# Patient Record
Sex: FEMALE | Race: WHITE | NOT HISPANIC OR LATINO | URBAN - METROPOLITAN AREA
[De-identification: names, ages, dates, MRNs, and addresses within clinical notes are randomized per-mention and may not be internally consistent; named-entity substitution may affect disease eponyms.]

---

## 2017-05-30 ENCOUNTER — EMERGENCY (EMERGENCY)
Facility: HOSPITAL | Age: 22
LOS: 1 days | End: 2017-05-30
Payer: MEDICAID

## 2017-05-30 PROCEDURE — 76856 US EXAM PELVIC COMPLETE: CPT | Mod: 26

## 2017-05-30 PROCEDURE — 74177 CT ABD & PELVIS W/CONTRAST: CPT | Mod: 26

## 2017-05-30 PROCEDURE — 76830 TRANSVAGINAL US NON-OB: CPT | Mod: 26

## 2017-05-30 PROCEDURE — 99285 EMERGENCY DEPT VISIT HI MDM: CPT | Mod: 25

## 2017-07-06 PROCEDURE — 76830 TRANSVAGINAL US NON-OB: CPT | Mod: 26

## 2017-07-06 PROCEDURE — 76856 US EXAM PELVIC COMPLETE: CPT | Mod: 26

## 2017-07-06 PROCEDURE — 71010: CPT | Mod: 26

## 2017-07-06 PROCEDURE — 74177 CT ABD & PELVIS W/CONTRAST: CPT | Mod: 26

## 2017-07-06 PROCEDURE — 99285 EMERGENCY DEPT VISIT HI MDM: CPT

## 2017-07-07 ENCOUNTER — INPATIENT (INPATIENT)
Facility: HOSPITAL | Age: 22
LOS: 1 days | Discharge: HOME | End: 2017-07-09
Payer: MEDICAID

## 2017-07-07 ENCOUNTER — OUTPATIENT (OUTPATIENT)
Dept: OUTPATIENT SERVICES | Facility: HOSPITAL | Age: 22
LOS: 1 days | End: 2017-07-07

## 2018-02-08 PROBLEM — Z00.00 ENCOUNTER FOR PREVENTIVE HEALTH EXAMINATION: Status: ACTIVE | Noted: 2018-02-08

## 2018-02-14 ENCOUNTER — APPOINTMENT (OUTPATIENT)
Dept: ANTEPARTUM | Facility: CLINIC | Age: 23
End: 2018-02-14
Payer: MEDICAID

## 2018-02-14 ENCOUNTER — ASOB RESULT (OUTPATIENT)
Age: 23
End: 2018-02-14

## 2018-02-14 PROCEDURE — 76817 TRANSVAGINAL US OBSTETRIC: CPT

## 2018-02-14 PROCEDURE — 76811 OB US DETAILED SNGL FETUS: CPT

## 2018-04-11 ENCOUNTER — ASOB RESULT (OUTPATIENT)
Age: 23
End: 2018-04-11

## 2018-04-11 ENCOUNTER — APPOINTMENT (OUTPATIENT)
Dept: ANTEPARTUM | Facility: CLINIC | Age: 23
End: 2018-04-11
Payer: MEDICAID

## 2018-04-11 PROCEDURE — 76816 OB US FOLLOW-UP PER FETUS: CPT

## 2018-04-11 PROCEDURE — 76819 FETAL BIOPHYS PROFIL W/O NST: CPT

## 2018-05-09 ENCOUNTER — APPOINTMENT (OUTPATIENT)
Dept: ANTEPARTUM | Facility: CLINIC | Age: 23
End: 2018-05-09

## 2018-05-22 ENCOUNTER — APPOINTMENT (OUTPATIENT)
Dept: ANTEPARTUM | Facility: CLINIC | Age: 23
End: 2018-05-22

## 2018-05-22 ENCOUNTER — APPOINTMENT (OUTPATIENT)
Dept: MATERNAL FETAL MEDICINE | Facility: CLINIC | Age: 23
End: 2018-05-22

## 2018-05-29 ENCOUNTER — APPOINTMENT (OUTPATIENT)
Dept: MATERNAL FETAL MEDICINE | Facility: CLINIC | Age: 23
End: 2018-05-29

## 2018-05-29 ENCOUNTER — APPOINTMENT (OUTPATIENT)
Dept: ANTEPARTUM | Facility: CLINIC | Age: 23
End: 2018-05-29

## 2019-09-27 ENCOUNTER — EMERGENCY (EMERGENCY)
Facility: HOSPITAL | Age: 24
LOS: 0 days | Discharge: HOME | End: 2019-09-27
Attending: EMERGENCY MEDICINE
Payer: MEDICAID

## 2019-09-27 VITALS — WEIGHT: 121.92 LBS | HEIGHT: 64 IN

## 2019-09-27 VITALS
TEMPERATURE: 99 F | RESPIRATION RATE: 18 BRPM | HEART RATE: 99 BPM | OXYGEN SATURATION: 96 % | DIASTOLIC BLOOD PRESSURE: 85 MMHG | SYSTOLIC BLOOD PRESSURE: 125 MMHG

## 2019-09-27 DIAGNOSIS — R11.0 NAUSEA: ICD-10-CM

## 2019-09-27 DIAGNOSIS — R21 RASH AND OTHER NONSPECIFIC SKIN ERUPTION: ICD-10-CM

## 2019-09-27 PROCEDURE — 99283 EMERGENCY DEPT VISIT LOW MDM: CPT

## 2019-09-27 RX ORDER — DIPHENHYDRAMINE HCL 50 MG
50 CAPSULE ORAL ONCE
Refills: 0 | Status: COMPLETED | OUTPATIENT
Start: 2019-09-27 | End: 2019-09-27

## 2019-09-27 RX ORDER — DIPHENHYDRAMINE HCL 50 MG
1 CAPSULE ORAL
Qty: 30 | Refills: 0
Start: 2019-09-27

## 2019-09-27 RX ORDER — CEPHALEXIN 500 MG
1 CAPSULE ORAL
Qty: 28 | Refills: 0
Start: 2019-09-27 | End: 2019-10-03

## 2019-09-27 RX ADMIN — Medication 50 MILLIGRAM(S): at 10:25

## 2019-09-27 NOTE — ED STATDOCS - OBJECTIVE STATEMENT
25 y/o female with no significant PMHx presents to the ED c/o gradual onset of diffuse pruritic rash. Pt believes she has poison ivy that began on upper extremities and is now diffuse to back and lower extremities. No Hx of poison ivy, but pt notes she recently went camping and was "cutting into roots." Pt notes Sx began roughly 2 days after camping. +Itching. Denies fever, chills. Denies possibility of pregnancy. Pt notes some nausea.

## 2019-09-27 NOTE — ED STATDOCS - PATIENT PORTAL LINK FT
You can access the FollowMyHealth Patient Portal offered by [facility] by registering at the following website: [email or web address]. By joining "[employer]"’s FollowMyHealth portal, you will also be able to view your health information using other applications (apps) compatible with our system.

## 2019-09-27 NOTE — ED STATDOCS - NSFOLLOWUPINSTRUCTIONS_ED_ALL_ED_FT
Poison Ivy Dermatitis  Image   Poison ivy dermatitis is redness and soreness (inflammation) of the skin. It is caused by a chemical that is found on the leaves of the poison ivy plant. You may also have itching, a rash, and blisters. Symptoms often clear up in 1–2 weeks.    You may get this condition by touching a poison ivy plant. You can also get it by touching something that has the chemical on it. This may include animals or objects that have come in contact with the plant.    Follow these instructions at home:  General instructions     Take or apply over-the-counter and prescription medicines only as told by your doctor.  If you touch poison ivy, wash your skin with soap and cold water right away.  Use hydrocortisone creams or calamine lotion as needed to help with itching.  Take oatmeal baths as needed. Use colloidal oatmeal. You can get this at a pharmacy or grocery store. Follow the instructions on the package.  Do not scratch or rub your skin.  While you have the rash, wash your clothes right after you wear them.  Prevention     Image   Know what poison ivy looks like so you can avoid it. This plant has three leaves with flowering branches on a single stem. The leaves are glossy. They have uneven edges that come to a point at the front.  If you have touched poison ivy, wash with soap and water right away. Be sure to wash under your fingernails.  When hiking or camping, wear long pants, a long-sleeved shirt, tall socks, and hiking boots. You can also use a lotion on your skin that helps to prevent contact with the chemical on the plant.  If you think that your clothes or outdoor gear came in contact with poison ivy, rinse them off with a garden hose before you bring them inside your house.  Contact a doctor if:  You have open sores in the rash area.  You have more redness, swelling, or pain in the affected area.  You have redness that spreads beyond the rash area.  You have fluid, blood, or pus coming from the affected area.  You have a fever.  You have a rash over a large area of your body.  You have a rash on your eyes, mouth, or genitals.  Your rash does not get better after a few days.  Get help right away if:  Your face swells or your eyes swell shut.  You have trouble breathing.  You have trouble swallowing.  This information is not intended to replace advice given to you by your health care provider. Make sure you discuss any questions you have with your health care provider.

## 2019-09-27 NOTE — ED ADULT TRIAGE NOTE - CHIEF COMPLAINT QUOTE
Pt ambulatory to triage with rash on arms, upper legs and buttocks, feels it may be poison ivy exposure from camping last weekend. Pt also wants to be evaluated for PID symptoms, states she was diagnosed with PID a year ago and has lower abdominal pain when she presses on her pelvis - denies fever

## 2019-09-27 NOTE — ED STATDOCS - PROGRESS NOTE DETAILS
25 y/o F with no PMH presents with diffuse itchy rash x 3-4 days. pt states she recently went camping and rash started shortly afterwards. While camping, was advised by her boyfriend that she may have been exposed to poison ivy. States she had areas on her skin that she "popped and had clear fluid come out." Has not used medications at home to manage symptoms. Denies fever, chills, nausea, vomiting, dysuria. No contacts with similar symptoms. No other reported complaints. PE: Well appearing. Scratching arms and legs during exam. Cardiac: s1s2. RRR. Lungs: CTAB. Abdomen: NBS x4, soft, nontender. Skin: Multiple old wounds with scabs. Pt scratching during exam. A/P: Likely contact dermatitis secondary to poison ivy. Concern for superinfection. Will manage with prednisone and keflex. d/c home with PCP follow up. - Miguel Shetty PA-C

## 2019-09-27 NOTE — ED STATDOCS - CARE PLAN
Principal Discharge DX:	Irritant contact dermatitis due to plants, except food Principal Discharge DX:	Rash

## 2019-09-27 NOTE — ED STATDOCS - ATTENDING CONTRIBUTION TO CARE
I, Nehemiah Kaur, performed the initial face to face bedside interview with this patient regarding history of present illness, review of symptoms and relevant past medical, social and family history.  I completed an independent physical examination.  I was the initial provider who evaluated this patient. I have signed out the follow up of any pending tests (i.e. labs, radiological studies) to the ACP.  I have communicated the patient’s plan of care and disposition with the ACP.  The history, relevant review of systems, past medical and surgical history, medical decision making, and physical examination was documented by the scribe in my presence and I attest to the accuracy of the documentation.
